# Patient Record
Sex: MALE | Race: WHITE | Employment: FULL TIME | URBAN - METROPOLITAN AREA
[De-identification: names, ages, dates, MRNs, and addresses within clinical notes are randomized per-mention and may not be internally consistent; named-entity substitution may affect disease eponyms.]

---

## 2017-10-30 ENCOUNTER — HOSPITAL ENCOUNTER (OUTPATIENT)
Age: 47
Setting detail: SPECIMEN
Discharge: HOME | End: 2017-10-30
Payer: COMMERCIAL

## 2017-10-30 DIAGNOSIS — I10 ESSENTIAL HYPERTENSION: ICD-10-CM

## 2017-10-30 DIAGNOSIS — E78.5 HYPERLIPIDEMIA, UNSPECIFIED HYPERLIPIDEMIA TYPE: ICD-10-CM

## 2017-10-30 DIAGNOSIS — Z11.4 ENCOUNTER FOR SCREENING FOR HIV: ICD-10-CM

## 2017-10-30 LAB
ANION GAP SERPL CALCULATED.3IONS-SCNC: 15 MMOL/L (ref 9–17)
BUN BLDV-MCNC: 13 MG/DL (ref 6–20)
BUN/CREAT BLD: NORMAL (ref 9–20)
CALCIUM SERPL-MCNC: 9.6 MG/DL (ref 8.6–10.4)
CHLORIDE BLD-SCNC: 103 MMOL/L (ref 98–107)
CHOLESTEROL, FASTING: 165 MG/DL
CHOLESTEROL/HDL RATIO: 3.7
CO2: 23 MMOL/L (ref 20–31)
CREAT SERPL-MCNC: 0.82 MG/DL (ref 0.7–1.2)
GFR AFRICAN AMERICAN: >60 ML/MIN
GFR NON-AFRICAN AMERICAN: >60 ML/MIN
GFR SERPL CREATININE-BSD FRML MDRD: NORMAL ML/MIN/{1.73_M2}
GFR SERPL CREATININE-BSD FRML MDRD: NORMAL ML/MIN/{1.73_M2}
GLUCOSE FASTING: 84 MG/DL (ref 70–99)
HDLC SERPL-MCNC: 45 MG/DL
HIV AG/AB: NONREACTIVE
LDL CHOLESTEROL: 98 MG/DL (ref 0–130)
POTASSIUM SERPL-SCNC: 4.4 MMOL/L (ref 3.7–5.3)
SODIUM BLD-SCNC: 141 MMOL/L (ref 135–144)
TRIGLYCERIDE, FASTING: 112 MG/DL
TSH SERPL DL<=0.05 MIU/L-ACNC: 1.47 MIU/L (ref 0.3–5)
VLDLC SERPL CALC-MCNC: NORMAL MG/DL (ref 1–30)

## 2018-04-23 PROBLEM — E78.5 HYPERLIPIDEMIA: Status: ACTIVE | Noted: 2018-04-23

## 2019-04-22 ENCOUNTER — HOSPITAL ENCOUNTER (OUTPATIENT)
Age: 49
Setting detail: SPECIMEN
Discharge: HOME | End: 2019-04-22
Payer: COMMERCIAL

## 2019-04-22 DIAGNOSIS — I10 ESSENTIAL HYPERTENSION: ICD-10-CM

## 2019-04-22 DIAGNOSIS — E78.5 HYPERLIPIDEMIA, UNSPECIFIED HYPERLIPIDEMIA TYPE: ICD-10-CM

## 2019-04-22 LAB
ALBUMIN SERPL-MCNC: 4.4 G/DL (ref 3.5–5.2)
ALBUMIN/GLOBULIN RATIO: 1.3 (ref 1–2.5)
ALP BLD-CCNC: 70 U/L (ref 40–129)
ALT SERPL-CCNC: 39 U/L (ref 5–41)
ANION GAP SERPL CALCULATED.3IONS-SCNC: 13 MMOL/L (ref 9–17)
AST SERPL-CCNC: 25 U/L
BILIRUB SERPL-MCNC: 0.56 MG/DL (ref 0.3–1.2)
BUN BLDV-MCNC: 11 MG/DL (ref 6–20)
BUN/CREAT BLD: NORMAL (ref 9–20)
CALCIUM SERPL-MCNC: 9.5 MG/DL (ref 8.6–10.4)
CHLORIDE BLD-SCNC: 101 MMOL/L (ref 98–107)
CHOLESTEROL, FASTING: 167 MG/DL
CHOLESTEROL/HDL RATIO: 3.6
CO2: 22 MMOL/L (ref 20–31)
CREAT SERPL-MCNC: 0.8 MG/DL (ref 0.7–1.2)
GFR AFRICAN AMERICAN: >60 ML/MIN
GFR NON-AFRICAN AMERICAN: >60 ML/MIN
GFR SERPL CREATININE-BSD FRML MDRD: NORMAL ML/MIN/{1.73_M2}
GFR SERPL CREATININE-BSD FRML MDRD: NORMAL ML/MIN/{1.73_M2}
GLUCOSE FASTING: 78 MG/DL (ref 70–99)
HDLC SERPL-MCNC: 46 MG/DL
LDL CHOLESTEROL: 97 MG/DL (ref 0–130)
POTASSIUM SERPL-SCNC: 4.2 MMOL/L (ref 3.7–5.3)
SODIUM BLD-SCNC: 136 MMOL/L (ref 135–144)
TOTAL PROTEIN: 7.7 G/DL (ref 6.4–8.3)
TRIGLYCERIDE, FASTING: 122 MG/DL
TSH SERPL DL<=0.05 MIU/L-ACNC: 1.81 MIU/L (ref 0.3–5)
VLDLC SERPL CALC-MCNC: NORMAL MG/DL (ref 1–30)

## 2019-09-09 ENCOUNTER — HOSPITAL ENCOUNTER (OUTPATIENT)
Age: 49
Setting detail: SPECIMEN
Discharge: HOME | End: 2019-09-09
Payer: COMMERCIAL

## 2019-09-09 DIAGNOSIS — K11.20 SIALOADENITIS: ICD-10-CM

## 2019-09-09 LAB
ABSOLUTE EOS #: 0.28 K/UL (ref 0–0.44)
ABSOLUTE IMMATURE GRANULOCYTE: 0.05 K/UL (ref 0–0.3)
ABSOLUTE LYMPH #: 2.43 K/UL (ref 1.1–3.7)
ABSOLUTE MONO #: 0.84 K/UL (ref 0.1–1.2)
BASOPHILS # BLD: 1 % (ref 0–2)
BASOPHILS ABSOLUTE: 0.07 K/UL (ref 0–0.2)
DIFFERENTIAL TYPE: ABNORMAL
EOSINOPHILS RELATIVE PERCENT: 3 % (ref 1–4)
HCT VFR BLD CALC: 51.5 % (ref 40.7–50.3)
HEMOGLOBIN: 16.6 G/DL (ref 13–17)
IMMATURE GRANULOCYTES: 1 %
LYMPHOCYTES # BLD: 23 % (ref 24–43)
MCH RBC QN AUTO: 27.4 PG (ref 25.2–33.5)
MCHC RBC AUTO-ENTMCNC: 32.2 G/DL (ref 28.4–34.8)
MCV RBC AUTO: 85 FL (ref 82.6–102.9)
MONOCYTES # BLD: 8 % (ref 3–12)
NRBC AUTOMATED: 0 PER 100 WBC
PDW BLD-RTO: 13.5 % (ref 11.8–14.4)
PLATELET # BLD: 437 K/UL (ref 138–453)
PLATELET ESTIMATE: ABNORMAL
PMV BLD AUTO: 8.8 FL (ref 8.1–13.5)
RBC # BLD: 6.06 M/UL (ref 4.21–5.77)
RBC # BLD: ABNORMAL 10*6/UL
SEG NEUTROPHILS: 64 % (ref 36–65)
SEGMENTED NEUTROPHILS ABSOLUTE COUNT: 6.8 K/UL (ref 1.5–8.1)
WBC # BLD: 10.5 K/UL (ref 3.5–11.3)
WBC # BLD: ABNORMAL 10*3/UL

## 2020-11-12 ENCOUNTER — HOSPITAL ENCOUNTER (OUTPATIENT)
Age: 50
Setting detail: SPECIMEN
Discharge: HOME | End: 2020-11-12
Payer: COMMERCIAL

## 2020-11-12 LAB
ABSOLUTE EOS #: 0.32 K/UL (ref 0–0.44)
ABSOLUTE IMMATURE GRANULOCYTE: 0.04 K/UL (ref 0–0.3)
ABSOLUTE LYMPH #: 2.19 K/UL (ref 1.1–3.7)
ABSOLUTE MONO #: 0.91 K/UL (ref 0.1–1.2)
ALBUMIN SERPL-MCNC: 4.4 G/DL (ref 3.5–5.2)
ALBUMIN/GLOBULIN RATIO: 1.3 (ref 1–2.5)
ALP BLD-CCNC: 79 U/L (ref 40–129)
ALT SERPL-CCNC: 47 U/L (ref 5–41)
ANION GAP SERPL CALCULATED.3IONS-SCNC: 12 MMOL/L (ref 9–17)
AST SERPL-CCNC: 30 U/L
BASOPHILS # BLD: 1 % (ref 0–2)
BASOPHILS ABSOLUTE: 0.06 K/UL (ref 0–0.2)
BILIRUB SERPL-MCNC: 0.58 MG/DL (ref 0.3–1.2)
BUN BLDV-MCNC: 13 MG/DL (ref 6–20)
BUN/CREAT BLD: ABNORMAL (ref 9–20)
CALCIUM SERPL-MCNC: 9.3 MG/DL (ref 8.6–10.4)
CHLORIDE BLD-SCNC: 102 MMOL/L (ref 98–107)
CHOLESTEROL, FASTING: 152 MG/DL
CHOLESTEROL/HDL RATIO: 3.5
CO2: 24 MMOL/L (ref 20–31)
CREAT SERPL-MCNC: 0.95 MG/DL (ref 0.7–1.2)
DIFFERENTIAL TYPE: ABNORMAL
EOSINOPHILS RELATIVE PERCENT: 3 % (ref 1–4)
GFR AFRICAN AMERICAN: >60 ML/MIN
GFR NON-AFRICAN AMERICAN: >60 ML/MIN
GFR SERPL CREATININE-BSD FRML MDRD: ABNORMAL ML/MIN/{1.73_M2}
GFR SERPL CREATININE-BSD FRML MDRD: ABNORMAL ML/MIN/{1.73_M2}
GLUCOSE FASTING: 84 MG/DL (ref 70–99)
HCT VFR BLD CALC: 51 % (ref 40.7–50.3)
HDLC SERPL-MCNC: 43 MG/DL
HEMOGLOBIN: 16.9 G/DL (ref 13–17)
IMMATURE GRANULOCYTES: 0 %
LDL CHOLESTEROL: 88 MG/DL (ref 0–130)
LYMPHOCYTES # BLD: 21 % (ref 24–43)
MCH RBC QN AUTO: 28.2 PG (ref 25.2–33.5)
MCHC RBC AUTO-ENTMCNC: 33.1 G/DL (ref 28.4–34.8)
MCV RBC AUTO: 85.1 FL (ref 82.6–102.9)
MONOCYTES # BLD: 9 % (ref 3–12)
NRBC AUTOMATED: 0 PER 100 WBC
PDW BLD-RTO: 13 % (ref 11.8–14.4)
PLATELET # BLD: 466 K/UL (ref 138–453)
PLATELET ESTIMATE: ABNORMAL
PMV BLD AUTO: 9.2 FL (ref 8.1–13.5)
POTASSIUM SERPL-SCNC: 4.3 MMOL/L (ref 3.7–5.3)
PROSTATE SPECIFIC ANTIGEN: 0.62 UG/L
RBC # BLD: 5.99 M/UL (ref 4.21–5.77)
RBC # BLD: ABNORMAL 10*6/UL
SEG NEUTROPHILS: 66 % (ref 36–65)
SEGMENTED NEUTROPHILS ABSOLUTE COUNT: 6.84 K/UL (ref 1.5–8.1)
SEX HORMONE BINDING GLOBULIN: 56 NMOL/L (ref 11–80)
SODIUM BLD-SCNC: 138 MMOL/L (ref 135–144)
TESTOSTERONE FREE-NONMALE: 75 PG/ML (ref 47–244)
TESTOSTERONE TOTAL: 509 NG/DL (ref 220–1000)
THYROXINE, FREE: 0.95 NG/DL (ref 0.93–1.7)
TOTAL PROTEIN: 7.7 G/DL (ref 6.4–8.3)
TRIGLYCERIDE, FASTING: 106 MG/DL
TSH SERPL DL<=0.05 MIU/L-ACNC: 1.61 MIU/L (ref 0.3–5)
VLDLC SERPL CALC-MCNC: NORMAL MG/DL (ref 1–30)
WBC # BLD: 10.4 K/UL (ref 3.5–11.3)
WBC # BLD: ABNORMAL 10*3/UL

## 2022-03-01 ENCOUNTER — HOSPITAL ENCOUNTER (OUTPATIENT)
Dept: GENERAL RADIOLOGY | Facility: CLINIC | Age: 52
Discharge: HOME | End: 2022-03-03
Payer: COMMERCIAL

## 2022-03-01 ENCOUNTER — HOSPITAL ENCOUNTER (OUTPATIENT)
Age: 52
Setting detail: SPECIMEN
Discharge: HOME | End: 2022-03-01

## 2022-03-01 DIAGNOSIS — M25.561 CHRONIC PAIN OF RIGHT KNEE: ICD-10-CM

## 2022-03-01 DIAGNOSIS — I10 ESSENTIAL HYPERTENSION: ICD-10-CM

## 2022-03-01 DIAGNOSIS — M25.562 CHRONIC PAIN OF BOTH KNEES: ICD-10-CM

## 2022-03-01 DIAGNOSIS — M25.561 CHRONIC PAIN OF BOTH KNEES: ICD-10-CM

## 2022-03-01 DIAGNOSIS — G89.29 CHRONIC PAIN OF RIGHT KNEE: ICD-10-CM

## 2022-03-01 DIAGNOSIS — G89.29 CHRONIC PAIN OF BOTH KNEES: ICD-10-CM

## 2022-03-01 DIAGNOSIS — E78.5 HYPERLIPIDEMIA, UNSPECIFIED HYPERLIPIDEMIA TYPE: ICD-10-CM

## 2022-03-01 LAB
ALBUMIN SERPL-MCNC: 4.5 G/DL (ref 3.5–5.2)
ALBUMIN/GLOBULIN RATIO: 1.6 (ref 1–2.5)
ALP BLD-CCNC: 76 U/L (ref 40–129)
ALT SERPL-CCNC: 34 U/L (ref 5–41)
ANION GAP SERPL CALCULATED.3IONS-SCNC: 14 MMOL/L (ref 9–17)
AST SERPL-CCNC: 23 U/L
BILIRUB SERPL-MCNC: 0.73 MG/DL (ref 0.3–1.2)
BUN BLDV-MCNC: 16 MG/DL (ref 6–20)
CALCIUM SERPL-MCNC: 9.5 MG/DL (ref 8.6–10.4)
CHLORIDE BLD-SCNC: 103 MMOL/L (ref 98–107)
CHOLESTEROL, FASTING: 215 MG/DL
CHOLESTEROL/HDL RATIO: 4.8
CO2: 24 MMOL/L (ref 20–31)
CREAT SERPL-MCNC: 0.89 MG/DL (ref 0.7–1.2)
GFR AFRICAN AMERICAN: >60 ML/MIN
GFR NON-AFRICAN AMERICAN: >60 ML/MIN
GFR SERPL CREATININE-BSD FRML MDRD: NORMAL ML/MIN/{1.73_M2}
GLUCOSE FASTING: 70 MG/DL (ref 70–99)
HDLC SERPL-MCNC: 45 MG/DL
LDL CHOLESTEROL: 148 MG/DL (ref 0–130)
POTASSIUM SERPL-SCNC: 4.6 MMOL/L (ref 3.7–5.3)
SODIUM BLD-SCNC: 141 MMOL/L (ref 135–144)
TOTAL PROTEIN: 7.3 G/DL (ref 6.4–8.3)
TRIGLYCERIDE, FASTING: 112 MG/DL
TSH SERPL DL<=0.05 MIU/L-ACNC: 1.79 MIU/L (ref 0.3–5)

## 2022-03-01 PROCEDURE — 73562 X-RAY EXAM OF KNEE 3: CPT

## 2022-03-21 ENCOUNTER — HOSPITAL ENCOUNTER (OUTPATIENT)
Dept: LAB | Age: 52
Setting detail: SPECIMEN
Discharge: HOME | End: 2022-03-21
Payer: COMMERCIAL

## 2022-03-21 DIAGNOSIS — Z01.818 PREOP TESTING: Primary | ICD-10-CM

## 2022-03-21 PROCEDURE — U0003 INFECTIOUS AGENT DETECTION BY NUCLEIC ACID (DNA OR RNA); SEVERE ACUTE RESPIRATORY SYNDROME CORONAVIRUS 2 (SARS-COV-2) (CORONAVIRUS DISEASE [COVID-19]), AMPLIFIED PROBE TECHNIQUE, MAKING USE OF HIGH THROUGHPUT TECHNOLOGIES AS DESCRIBED BY CMS-2020-01-R: HCPCS

## 2022-03-21 PROCEDURE — U0005 INFEC AGEN DETEC AMPLI PROBE: HCPCS

## 2022-03-22 LAB
SARS-COV-2: NORMAL
SARS-COV-2: NOT DETECTED
SOURCE: NORMAL

## 2022-03-24 ENCOUNTER — ANESTHESIA EVENT (OUTPATIENT)
Dept: OPERATING ROOM | Age: 52
End: 2022-03-24
Payer: COMMERCIAL

## 2022-03-25 ENCOUNTER — ANESTHESIA (OUTPATIENT)
Dept: OPERATING ROOM | Age: 52
End: 2022-03-25
Payer: COMMERCIAL

## 2022-03-25 ENCOUNTER — HOSPITAL ENCOUNTER (OUTPATIENT)
Age: 52
Setting detail: OUTPATIENT SURGERY
Discharge: HOME | End: 2022-03-25
Attending: OTOLARYNGOLOGY | Admitting: OTOLARYNGOLOGY
Payer: COMMERCIAL

## 2022-03-25 VITALS
OXYGEN SATURATION: 97 % | HEIGHT: 68 IN | BODY MASS INDEX: 41.36 KG/M2 | WEIGHT: 272.93 LBS | DIASTOLIC BLOOD PRESSURE: 86 MMHG | RESPIRATION RATE: 12 BRPM | TEMPERATURE: 97.6 F | SYSTOLIC BLOOD PRESSURE: 143 MMHG | HEART RATE: 83 BPM

## 2022-03-25 VITALS — DIASTOLIC BLOOD PRESSURE: 130 MMHG | SYSTOLIC BLOOD PRESSURE: 200 MMHG | OXYGEN SATURATION: 94 % | TEMPERATURE: 96.8 F

## 2022-03-25 PROCEDURE — 7100000011 HC PHASE II RECOVERY - ADDTL 15 MIN: Performed by: OTOLARYNGOLOGY

## 2022-03-25 PROCEDURE — 3600000012 HC SURGERY LEVEL 2 ADDTL 15MIN: Performed by: OTOLARYNGOLOGY

## 2022-03-25 PROCEDURE — 7100000001 HC PACU RECOVERY - ADDTL 15 MIN: Performed by: OTOLARYNGOLOGY

## 2022-03-25 PROCEDURE — 7100000000 HC PACU RECOVERY - FIRST 15 MIN: Performed by: OTOLARYNGOLOGY

## 2022-03-25 PROCEDURE — 88305 TISSUE EXAM BY PATHOLOGIST: CPT

## 2022-03-25 PROCEDURE — 6370000000 HC RX 637 (ALT 250 FOR IP): Performed by: ANESTHESIOLOGY

## 2022-03-25 PROCEDURE — 2500000003 HC RX 250 WO HCPCS: Performed by: SPECIALIST

## 2022-03-25 PROCEDURE — 3700000001 HC ADD 15 MINUTES (ANESTHESIA): Performed by: OTOLARYNGOLOGY

## 2022-03-25 PROCEDURE — 7100000010 HC PHASE II RECOVERY - FIRST 15 MIN: Performed by: OTOLARYNGOLOGY

## 2022-03-25 PROCEDURE — 2580000003 HC RX 258: Performed by: ANESTHESIOLOGY

## 2022-03-25 PROCEDURE — 88313 SPECIAL STAINS GROUP 2: CPT

## 2022-03-25 PROCEDURE — 3700000000 HC ANESTHESIA ATTENDED CARE: Performed by: OTOLARYNGOLOGY

## 2022-03-25 PROCEDURE — 2709999900 HC NON-CHARGEABLE SUPPLY: Performed by: OTOLARYNGOLOGY

## 2022-03-25 PROCEDURE — 6360000002 HC RX W HCPCS: Performed by: SPECIALIST

## 2022-03-25 PROCEDURE — 3600000002 HC SURGERY LEVEL 2 BASE: Performed by: OTOLARYNGOLOGY

## 2022-03-25 RX ORDER — SODIUM CHLORIDE 9 MG/ML
25 INJECTION, SOLUTION INTRAVENOUS PRN
Status: DISCONTINUED | OUTPATIENT
Start: 2022-03-25 | End: 2022-03-25 | Stop reason: HOSPADM

## 2022-03-25 RX ORDER — SODIUM CHLORIDE, SODIUM LACTATE, POTASSIUM CHLORIDE, CALCIUM CHLORIDE 600; 310; 30; 20 MG/100ML; MG/100ML; MG/100ML; MG/100ML
INJECTION, SOLUTION INTRAVENOUS CONTINUOUS
Status: DISCONTINUED | OUTPATIENT
Start: 2022-03-26 | End: 2022-03-25 | Stop reason: HOSPADM

## 2022-03-25 RX ORDER — FENTANYL CITRATE 50 UG/ML
INJECTION, SOLUTION INTRAMUSCULAR; INTRAVENOUS PRN
Status: DISCONTINUED | OUTPATIENT
Start: 2022-03-25 | End: 2022-03-25 | Stop reason: SDUPTHER

## 2022-03-25 RX ORDER — FENTANYL CITRATE 50 UG/ML
25 INJECTION, SOLUTION INTRAMUSCULAR; INTRAVENOUS EVERY 5 MIN PRN
Status: DISCONTINUED | OUTPATIENT
Start: 2022-03-25 | End: 2022-03-25 | Stop reason: HOSPADM

## 2022-03-25 RX ORDER — DEXAMETHASONE SODIUM PHOSPHATE 10 MG/ML
INJECTION INTRAMUSCULAR; INTRAVENOUS PRN
Status: DISCONTINUED | OUTPATIENT
Start: 2022-03-25 | End: 2022-03-25 | Stop reason: SDUPTHER

## 2022-03-25 RX ORDER — PROPOFOL 10 MG/ML
INJECTION, EMULSION INTRAVENOUS PRN
Status: DISCONTINUED | OUTPATIENT
Start: 2022-03-25 | End: 2022-03-25 | Stop reason: SDUPTHER

## 2022-03-25 RX ORDER — LIDOCAINE HYDROCHLORIDE 20 MG/ML
INJECTION, SOLUTION EPIDURAL; INFILTRATION; INTRACAUDAL; PERINEURAL PRN
Status: DISCONTINUED | OUTPATIENT
Start: 2022-03-25 | End: 2022-03-25 | Stop reason: SDUPTHER

## 2022-03-25 RX ORDER — ROCURONIUM BROMIDE 10 MG/ML
INJECTION, SOLUTION INTRAVENOUS PRN
Status: DISCONTINUED | OUTPATIENT
Start: 2022-03-25 | End: 2022-03-25 | Stop reason: SDUPTHER

## 2022-03-25 RX ORDER — HYDROMORPHONE HYDROCHLORIDE 1 MG/ML
0.25 INJECTION, SOLUTION INTRAMUSCULAR; INTRAVENOUS; SUBCUTANEOUS EVERY 5 MIN PRN
Status: DISCONTINUED | OUTPATIENT
Start: 2022-03-25 | End: 2022-03-25 | Stop reason: HOSPADM

## 2022-03-25 RX ORDER — SODIUM CHLORIDE 0.9 % (FLUSH) 0.9 %
5-40 SYRINGE (ML) INJECTION EVERY 12 HOURS SCHEDULED
Status: DISCONTINUED | OUTPATIENT
Start: 2022-03-25 | End: 2022-03-25 | Stop reason: HOSPADM

## 2022-03-25 RX ORDER — SODIUM CHLORIDE 0.9 % (FLUSH) 0.9 %
5-40 SYRINGE (ML) INJECTION PRN
Status: DISCONTINUED | OUTPATIENT
Start: 2022-03-25 | End: 2022-03-25 | Stop reason: HOSPADM

## 2022-03-25 RX ORDER — ONDANSETRON 2 MG/ML
INJECTION INTRAMUSCULAR; INTRAVENOUS PRN
Status: DISCONTINUED | OUTPATIENT
Start: 2022-03-25 | End: 2022-03-25 | Stop reason: SDUPTHER

## 2022-03-25 RX ORDER — ONDANSETRON 2 MG/ML
4 INJECTION INTRAMUSCULAR; INTRAVENOUS
Status: DISCONTINUED | OUTPATIENT
Start: 2022-03-25 | End: 2022-03-25 | Stop reason: HOSPADM

## 2022-03-25 RX ORDER — AMLODIPINE BESYLATE 10 MG/1
10 TABLET ORAL ONCE
Status: COMPLETED | OUTPATIENT
Start: 2022-03-25 | End: 2022-03-25

## 2022-03-25 RX ORDER — AMLODIPINE BESYLATE 10 MG/1
10 TABLET ORAL DAILY
Status: DISCONTINUED | OUTPATIENT
Start: 2022-03-25 | End: 2022-03-25

## 2022-03-25 RX ORDER — LIDOCAINE HYDROCHLORIDE 10 MG/ML
1 INJECTION, SOLUTION EPIDURAL; INFILTRATION; INTRACAUDAL; PERINEURAL
Status: DISCONTINUED | OUTPATIENT
Start: 2022-03-25 | End: 2022-03-25 | Stop reason: HOSPADM

## 2022-03-25 RX ADMIN — ONDANSETRON 4 MG: 2 INJECTION INTRAMUSCULAR; INTRAVENOUS at 09:54

## 2022-03-25 RX ADMIN — SODIUM CHLORIDE, POTASSIUM CHLORIDE, SODIUM LACTATE AND CALCIUM CHLORIDE: 600; 310; 30; 20 INJECTION, SOLUTION INTRAVENOUS at 07:56

## 2022-03-25 RX ADMIN — LIDOCAINE HYDROCHLORIDE 100 MG: 20 INJECTION, SOLUTION EPIDURAL; INFILTRATION; INTRACAUDAL; PERINEURAL at 09:42

## 2022-03-25 RX ADMIN — ROCURONIUM BROMIDE 40 MG: 10 INJECTION, SOLUTION INTRAVENOUS at 09:42

## 2022-03-25 RX ADMIN — SUGAMMADEX 200 MG: 100 INJECTION, SOLUTION INTRAVENOUS at 09:57

## 2022-03-25 RX ADMIN — Medication 75 MCG: at 09:42

## 2022-03-25 RX ADMIN — DEXAMETHASONE SODIUM PHOSPHATE 10 MG: 10 INJECTION INTRAMUSCULAR; INTRAVENOUS at 09:52

## 2022-03-25 RX ADMIN — AMLODIPINE BESYLATE 10 MG: 10 TABLET ORAL at 08:26

## 2022-03-25 RX ADMIN — PROPOFOL 200 MG: 10 INJECTION, EMULSION INTRAVENOUS at 09:42

## 2022-03-25 ASSESSMENT — PULMONARY FUNCTION TESTS
PIF_VALUE: 23
PIF_VALUE: 35
PIF_VALUE: 29
PIF_VALUE: 1
PIF_VALUE: 1
PIF_VALUE: 35
PIF_VALUE: 1
PIF_VALUE: 34
PIF_VALUE: 35
PIF_VALUE: 2
PIF_VALUE: 1
PIF_VALUE: 35
PIF_VALUE: 11
PIF_VALUE: 11
PIF_VALUE: 37
PIF_VALUE: 35
PIF_VALUE: 23
PIF_VALUE: 1
PIF_VALUE: 1
PIF_VALUE: 5
PIF_VALUE: 0
PIF_VALUE: 35
PIF_VALUE: 1
PIF_VALUE: 22
PIF_VALUE: 35

## 2022-03-25 ASSESSMENT — PAIN - FUNCTIONAL ASSESSMENT: PAIN_FUNCTIONAL_ASSESSMENT: 0-10

## 2022-03-25 ASSESSMENT — PAIN SCALES - GENERAL
PAINLEVEL_OUTOF10: 0
PAINLEVEL_OUTOF10: 0

## 2022-03-25 NOTE — ANESTHESIA PRE PROCEDURE
Department of Anesthesiology  Preprocedure Note       Name:  Reji Diana   Age:  45 y.o.  :  1970                                          MRN:  [id number]         Date:  3/25/2022      Surgeon: Dorothy Parish): Elizabeth Tabor MD    Procedure: Procedure(s):  DIRECT LARYNGOSCOPY MICRO WITH EXCISION VOCAL CORD TUMOR    Medications prior to admission:   Prior to Admission medications    Medication Sig Start Date End Date Taking? Authorizing Provider   amLODIPine-atorvastatatin (CADUET) 10-20 MG per tablet Take 1 tablet by mouth daily 21   [facility], DO   fexofenadine (RA ALLERGY RELIEF) 180 MG tablet take 1 tablet by mouth once daily for allergies if needed 21   [facility], DO   ammonium lactate (LAC-HYDRIN) 12 % lotion Apply topically daily. 20   [facility], DO       Current medications:    Current Facility-Administered Medications   Medication Dose Route Frequency Provider Last Rate Last Admin    lidocaine PF 1 % injection 1 mL  1 mL IntraDERmal Once PRN MD Maegan Rutledge ON 3/26/2022] lactated ringers infusion   IntraVENous Continuous David Bryan MD 50 mL/hr at 22 0756 New Bag at 22 0756       Allergies: Allergies   Allergen Reactions    Molds & Smuts        Problem List:    Patient Active Problem List   Diagnosis Code    Dry skin dermatitis L85.3    Recurrent sinusitis J32.9    HTN (hypertension) I10    Hyperlipidemia E78.5       Past Medical History:        Diagnosis Date    Arthritis     Dry skin dermatitis 2015    HTN (hypertension) 2015    Hyperlipidemia 2018    Recurrent sinusitis 2015       Past Surgical History:        Procedure Laterality Date    RHINOPLASTY      TONSILLECTOMY      TURBINATE RESECTION      VASECTOMY         Social History:    Social History     Tobacco Use    Smoking status: Never Smoker    Smokeless tobacco: Never Used   Substance Use Topics    Alcohol use:  Yes Counseling given: Not Answered      Vital Signs (Current):   Vitals:    03/25/22 0721 03/25/22 0729 03/25/22 0746 03/25/22 0826   BP: (!) 204/112  (!) 167/92 (!) 167/92   Pulse: 103  95    Resp: 20      Temp: 98.6 °F (37 °C)      TempSrc: Temporal      SpO2: 98%      Weight:  272 lb 14.9 oz (123.8 kg)     Height:  5' 8\" (1.727 m)                                                BP Readings from Last 3 Encounters:   03/25/22 (!) 167/92   11/11/21 128/80   05/11/21 138/88       NPO Status: Time of last liquid consumption: 2200                        Time of last solid consumption: 2200                        Date of last liquid consumption: 03/24/22                        Date of last solid food consumption: 03/24/22    BMI:   Wt Readings from Last 3 Encounters:   03/25/22 272 lb 14.9 oz (123.8 kg)   11/11/21 280 lb (127 kg)   05/11/21 289 lb 6.4 oz (131.3 kg)     Body mass index is 41.5 kg/m². CBC:   Lab Results   Component Value Date    WBC 10.4 11/12/2020    RBC 5.99 11/12/2020    HGB 16.9 11/12/2020    HCT 51.0 11/12/2020    MCV 85.1 11/12/2020    RDW 13.0 11/12/2020     11/12/2020       CMP:   Lab Results   Component Value Date     03/01/2022    K 4.6 03/01/2022     03/01/2022    CO2 24 03/01/2022    BUN 16 03/01/2022    CREATININE 0.89 03/01/2022    GFRAA >60 03/01/2022    LABGLOM >60 03/01/2022    GLUCOSE 82 02/08/2016    PROT 7.3 03/01/2022    CALCIUM 9.5 03/01/2022    BILITOT 0.73 03/01/2022    ALKPHOS 76 03/01/2022    AST 23 03/01/2022    ALT 34 03/01/2022       POC Tests: No results for input(s): POCGLU, POCNA, POCK, POCCL, POCBUN, POCHEMO, POCHCT in the last 72 hours.     Coags: No results found for: PROTIME, INR, APTT    HCG (If Applicable): No results found for: PREGTESTUR, PREGSERUM, HCG, HCGQUANT     ABGs: No results found for: PHART, PO2ART, FAQ8UTS, GLF6QNQ, BEART, M2VSYIGJ     Type & Screen (If Applicable):  No results found for: LABABO, LABRH    Drug/Infectious Status (If Applicable):  No results found for: HIV, HEPCAB    COVID-19 Screening (If Applicable):   Lab Results   Component Value Date    COVID19 Not Detected 03/21/2022           Anesthesia Evaluation   no history of anesthetic complications:   Airway: Mallampati: III  TM distance: >3 FB   Neck ROM: full  Mouth opening: > = 3 FB Dental:          Pulmonary:Negative Pulmonary ROS and normal exam                               Cardiovascular:  Exercise tolerance: good (>4 METS),   (+) hypertension:,     (-)  angina                Neuro/Psych:   Negative Neuro/Psych ROS              GI/Hepatic/Renal: Neg GI/Hepatic/Renal ROS  (+) morbid obesity          Endo/Other: Negative Endo/Other ROS   (+) : arthritis: OA., .                 Abdominal:             Vascular: Other Findings:           Anesthesia Plan      general     ASA 2     (Glide )  Induction: intravenous. MIPS: Postoperative opioids intended and Prophylactic antiemetics administered. Anesthetic plan and risks discussed with patient. Plan discussed with CRNA.     Attending anesthesiologist reviewed and agrees with Kenna Lubin MD   3/25/2022

## 2022-03-25 NOTE — H&P
Interval H&P Note    Pt Name: Price Yung  MRN: [id number]  YOB: 1970  Date of evaluation: 3/25/2022      [x] I have reviewed the hardcopy ENT Note by Dr Dion Pantoja dated 3/3/22 labeled in short chart for an Interval History and Physical note. [x] I have examined  Price Yung  There are no changes to the patient who is scheduled for a direct laryngoscopy micro with excision vocal cord tumor by Dr Dion Pantoja for dysphonia, radicular cyst, GERD The patient denies new health changes, fever, chills, wheezing, cough, increased SOB, chest pain, open sores or wounds. Past Medical History:     Past Medical History:   Diagnosis Date    Arthritis     Dry skin dermatitis 2/17/2015    HTN (hypertension) 5/18/2015    Hyperlipidemia 4/23/2018    Recurrent sinusitis 2/17/2015        Past Surgical History:     Past Surgical History:   Procedure Laterality Date    RHINOPLASTY      TONSILLECTOMY      TURBINATE RESECTION      VASECTOMY  1994        Social History:     Tobacco:    reports that he has never smoked. He has never used smokeless tobacco.  Alcohol:      reports current alcohol use. Drug Use:  reports no history of drug use. Family History:     Family History   Problem Relation Age of Onset    Heart Disease Maternal Grandmother     Cancer Maternal Grandfather         Colon/Rectal  cancer       Vital signs: BP (!) 167/92   Pulse 95   Temp 98.6 °F (37 °C) (Temporal)   Resp 20   Ht 5' 8\" (1.727 m)   Wt 272 lb 14.9 oz (123.8 kg)   SpO2 98%   BMI 41.50 kg/m²     Allergies:  Molds & smuts    Medications:    Prior to Admission medications    Medication Sig Start Date End Date Taking?  Authorizing Provider   amLODIPine-atorvastatatin (CADUET) 10-20 MG per tablet Take 1 tablet by mouth daily 11/11/21   Mar Bitter, DO   fexofenadine (RA ALLERGY RELIEF) 180 MG tablet take 1 tablet by mouth once daily for allergies if needed 11/11/21   Mar Bitter, DO   ammonium lactate (LAC-HYDRIN) 12 % lotion Apply topically daily. 11/12/20   Inell Bosworth, DO         This is a 45 y.o.morbidly obese male who is pleasant, cooperative, alert and oriented x3, in no acute distress    Physical Exam:  Lungs: Bilateral equal air entry, unlabored, clear to ausculation, no wheezing, rales or rhonchi, normal effort  Cardiovascular: HR 95 normal rate, regular rhythm, no murmur, gallop, rub. No carotid bruits  Abdomen: Obese, round, nontender, nondistended, normal bowel sounds.     Labs:  Recent Labs     03/01/22  1330      K 4.6      CO2 24   BUN 16   CREATININE 0.89   AST 23   ALT 34   LABALBU 4.5       Recent Labs     03/21/22  4908 Greg Hauser       Not Detected       Myak, Ofilia Dangelo, APRN - CNP   Electronically signed 3/25/2022 at 8:58 AM

## 2022-03-25 NOTE — PROGRESS NOTES
Dr Ken Whiting at bedside. Per Dr. Ken Whiting no food restrictions and minimum 48 hours vocal rest up to 1 week.

## 2022-03-25 NOTE — OP NOTE
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       Procedure(s):  DIRECT MICROLARYNGOSCOPY WITH EXCISION of LEFT VOCAL CORD TUMOR    Surgeon(s): Mitcheal Epley, MD    Assistant:   * No surgical staff found *    Anesthesia: General    Estimated Blood Loss (mL): Minimal    Complications: None    Specimens:   ID Type Source Tests Collected by Time Destination   A : left vocal cord tumor Tissue Mouth SURGICAL PATHOLOGY Afser Lety Lauren MD 3/25/2022 9101        Implants:  * No implants in log *      Drains: * No LDAs found *    Findings: 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 The risks and benefits were discussed. These include but are not limited to bleeding, injury to teeth/gums, mucosal tears, loss of airway, and death. All questions were answered and informed consent was obtained. Procedure:    Diony Conroy  was identified in the preoperative holding area and brought to the operating room. A general anesthetic was given and the patient was intubated. A tooth guard was placed and a rigid laryngoscope was passed into the oral cavity and into the pharynx. Examination of the hard palate, soft palate, tongue, floor of mouth, oropharynx, hypopharynx, pyriform sinuses, esophageal inlet, base of tongue, supraglottis, and true vocal cords was done. Significant findings and additional comments are listed above. The scope was placed on suspension when the lesion on the vocal cord was in clear view. The Microscope was brought into the field. Cup forceps were used to excise the entire tumor with minimal trauma to adjacent mucosa. The mouth guard and scope was removed. There were no complications. Thiago Beauchamp was awakened, extubated, and returned to recovery in acceptable condition.                  Electronically signed by Bob Pryor MD on 3/25/2022 at 9:57 AM

## 2022-03-25 NOTE — ANESTHESIA POSTPROCEDURE EVALUATION
Department of Anesthesiology  Postprocedure Note    Patient: Valeria Joshi  MRN: [id number]  [place]: 1970  Date of evaluation: 3/25/2022  Time:  2:25 PM     Procedure Summary     Date: 22 Room / Location: [address] [place] [address]    Anesthesia Start:  Anesthesia Stop:     Procedure: DIRECT LARYNGOSCOPY MICRO WITH EXCISION VOCAL CORD TUMOR (N/A ) Diagnosis: (DX DYSPHONIA, RADICULAR CYST, GERD)    Surgeons: Katherine Reynaga MD Responsible Provider: Heike Ribera MD    Anesthesia Type: general ASA Status: 2          Anesthesia Type: general    Mitra Phase I: Mitra Score: 9    Mitra Phase II: Mitra Score: 10    Last vitals: Reviewed and per EMR flowsheets.        Anesthesia Post Evaluation    Patient location during evaluation: PACU  Patient participation: complete - patient participated  Level of consciousness: awake  Airway patency: patent  Nausea & Vomiting: no nausea  Complications: no  Cardiovascular status: blood pressure returned to baseline  Respiratory status: acceptable  Hydration status: euvolemic  Comments: Multimodal analgesia pain management as indicated by procedure  Multimodal analgesia pain management approach

## 2022-03-28 LAB — SURGICAL PATHOLOGY REPORT: NORMAL

## 2022-05-20 DIAGNOSIS — M17.0 PRIMARY OSTEOARTHRITIS OF BOTH KNEES: Primary | ICD-10-CM

## 2022-05-23 ENCOUNTER — OFFICE VISIT (OUTPATIENT)
Dept: ORTHOPEDIC SURGERY | Age: 52
End: 2022-05-23
Payer: COMMERCIAL

## 2022-05-23 VITALS — HEIGHT: 68 IN | WEIGHT: 272 LBS | BODY MASS INDEX: 41.22 KG/M2

## 2022-05-23 DIAGNOSIS — M17.0 ARTHRITIS OF BOTH KNEES: Primary | ICD-10-CM

## 2022-05-23 PROCEDURE — 20610 DRAIN/INJ JOINT/BURSA W/O US: CPT | Performed by: PHYSICIAN ASSISTANT

## 2022-05-23 PROCEDURE — 99204 OFFICE O/P NEW MOD 45 MIN: CPT | Performed by: PHYSICIAN ASSISTANT

## 2022-05-23 NOTE — PROGRESS NOTES
[address] [facility]  [facility] [place] [address]  Dept: [phone]    Ambulatory Orthopedic Consult      CHIEF COMPLAINT:    Chief Complaint   Patient presents with    New Patient     BILATERAL KNEE PAIN        HISTORY OF PRESENT ILLNESS:        The patient is a 45 y.o. male who is being seen at the request of  Adarsh Dorsey DO for consultation and evaluation of bilateral knee pain. Saumya Avina  presents for bilateral knee pain that has been present for  2 years but is gradually increased over the last 1 to 2 months. The patient does recall a specific injury. He notes that he was doing yard work and jumped out of the bed of a truck causing discomfort within the bilateral knee. He did not seek medical evaluation and has been doing conservative treatment including icing, elevating and over-the-counter anti-inflammatories to try to help his discomfort. Patient notes the pain worsens with  prolonged walking, prolonged standing, stair climbing and arising from a seated position. Instability is not noted. The patient has not had a previous corticosteroid injection. The patient has not had previous physical therapy for this problem. The patient has tried oral NSAIDs for this problem previously. He denies prior surgery to the bilateral knee.         Past Medical History:    Past Medical History:   Diagnosis Date    Arthritis     Dry skin dermatitis 2/17/2015    HTN (hypertension) 5/18/2015    Hyperlipidemia 4/23/2018    Recurrent sinusitis 2/17/2015       Past Surgical History:    Past Surgical History:   Procedure Laterality Date    LARYNGOSCOPY N/A 3/25/2022    DIRECT LARYNGOSCOPY MICRO WITH EXCISION VOCAL CORD TUMOR performed by Mathew Garcia MD at [address] VASECTOMY  1994       Current Medications:   Current Outpatient Medications   Medication Sig Dispense Refill    amLODIPine-atorvastatatin (CADUET) 10-20 MG per tablet Take 1 tablet by mouth daily 90 tablet 1    fexofenadine (RA ALLERGY RELIEF) 180 MG tablet take 1 tablet by mouth once daily for allergies if needed 90 tablet 1    ammonium lactate (LAC-HYDRIN) 12 % lotion Apply topically daily. 567 g 2     Current Facility-Administered Medications   Medication Dose Route Frequency Provider Last Rate Last Admin    methylPREDNISolone acetate (DEPO-MEDROL) injection 80 mg  80 mg Intra-artICUlar Once Kaity E Pfund, PA-C        methylPREDNISolone acetate (DEPO-MEDROL) injection 80 mg  80 mg Intra-artICUlar Once Kaity E Pfund, PA-C        bupivacaine (MARCAINE) 0.25 % injection 5 mg  2 mL Intra-artICUlar Once Kaity E Pfund, PA-C        bupivacaine (MARCAINE) 0.25 % injection 5 mg  2 mL Intra-artICUlar Once Kaity E Pfund, PA-C           Allergies:    Molds & smuts    Social History:   Social History     Socioeconomic History    Marital status:      Spouse name: Not on file    Number of children: Not on file    Years of education: Not on file    Highest education level: Not on file   Occupational History    Not on file   Tobacco Use    Smoking status: Never Smoker    Smokeless tobacco: Never Used   Vaping Use    Vaping Use: Never used   Substance and Sexual Activity    Alcohol use: Yes    Drug use: Never    Sexual activity: Not on file   Other Topics Concern    Not on file   Social History Narrative    Not on file     Social Determinants of Health     Financial Resource Strain:     Difficulty of Paying Living Expenses: Not on file   Food Insecurity:     Worried About Running Out of Food in the Last Year: Not on file    Jorge of Food in the Last Year: Not on file   Transportation Needs:     Lack of Transportation (Medical): Not on file    Lack of Transportation (Non-Medical):  Not on file   Physical Activity:     Days of Exercise per Week: Not on file    Minutes of Exercise per Session: Not independently without a limp appreciated. Evaluation of the Bilateral knee reveals no significant outward deformity. There is no erythema, skin warmth, skin lesions, or signs of infection appreciated. There is tenderness over the medial joint line with palpation, bilaterally. There is a no appreciable knee effusion noted bilaterally. Range of motion of the Bilateral knee is full. No instability of the knee is appreciated at 0 and 30° of flexion, bilaterally. There is a negative anterior drawer and Lachman's test, bilaterally. There is increased pain with medial Bella's testing, bilaterally. No calf tenderness is noted, bilaterally. There is a negative hip log roll and Stinchfield test on the Bilateral hip. Motor, sensory, vascular examination to the Bilateral lower extremity is intact. Patient has full range of motion of the Bilateral ankle. Radiology:   XR KNEE LEFT (MIN 4 VIEWS)    Result Date: 5/25/2022  History:   Left knee pain. Findings:   Standing AP/Lateral/Tunnel/Merchant view xrays of the Left knee done in the office today shows moderate medial joint space narrowing, tricompartmental osteophytosis, joint line sclerosis medially. No evidence of fracture, subluxation, dislocation, radioopaque foreign body/tumor is noted. Lateral subluxation of the tibia is appreciated. Impression:   Left knee moderate degenerative changes as described above. XR KNEE RIGHT (MIN 4 VIEWS)    Result Date: 5/25/2022  History:   Right knee pain. Findings:   Standing AP/Lateral/Tunnel/Merchant view xrays of the Right knee done in the office today shows moderate medial joint space narrowing, tricompartmental osteophytosis, joint line sclerosis medially. No evidence of fracture, subluxation, dislocation, radioopaque foreign body/tumor is noted. Lateral subluxation of the tibia is appreciated. Impression:   Right knee moderate degenerative changes as described above.       Procedure:  KNEE INJECTION PROCEDURE NOTE:  The patient was identified. Verbal consent was obtained to proceed with a Right  knee injection. The Right knee was confirmed with the patient. After a sterile prep with Betadine the knee was injected using a lateral joint line approach with a mixture of  2mL of 0.25% Marcaine and 80 mg of Depo-Medrol. Patient tolerated the procedure well without post injection complications. I instructed the patient to call our office immediately if they have any swelling or increased pain at the injection site. KNEE INJECTION PROCEDURE NOTE:  The patient was identified. Verbal consent was obtained to proceed with a Left  knee injection. The Left knee was confirmed with the patient. After a sterile prep with Betadine the knee was injected using a lateral joint line approach with a mixture of  2mL of 0.25% Marcaine and 80 mg of Depo-Medrol. Patient tolerated the procedure well without post injection complications. I instructed the patient to call our office immediately if they have any swelling or increased pain at the injection site. ASSESSMENT:     1. Arthritis of both knees         PLAN:       Today in office we discussed etiology and natural history of bilateral knee pain due to osteoarthritis. I personally reviewed the patient's x-rays from today revealing moderate degenerative changes within the bilateral knee. The treatment options may include activity modification, oral anti-inflammatories, bracing, injections, advanced imaging, physical therapy and/or surgical intervention. The patient would like to proceed with:  1.  Bilateral knee corticosteroid injections. The patient was given the injections today in office. He tolerated the procedures without complication.   We discussed that he can have repeat injections every 4 months if necessary therefore he will be eligible for repeat injection on or after 9/23/2022.  2.  Patient was given a packet of home exercises to complete to work on bilateral lower extremity strengthening and range of motion restoration. The patient will follow up in 6 weeks, or sooner if needed. We discussed that the patient should call us with any questions or concerns. The patient voiced his understanding. Return in about 6 weeks (around 7/4/2022) for re-evaluation. Total Time: 45 min      Orders Placed This Encounter   Medications    methylPREDNISolone acetate (DEPO-MEDROL) injection 80 mg    methylPREDNISolone acetate (DEPO-MEDROL) injection 80 mg    bupivacaine (MARCAINE) 0.25 % injection 5 mg    bupivacaine (MARCAINE) 0.25 % injection 5 mg       Orders Placed This Encounter   Procedures    MD ARTHROCENTESIS ASPIR&/INJ MAJOR JT/BURSA W/O US       This note is created with the assistance of a speech recognition program.  While intending to generate a document that actually reflects the content of the visit, the document can still have some errors including those of syntax and sound a like substitutions which may escape proof reading. In such instances, actual meaning can be extrapolated by contextual diversion.      Electronically signed by Flaca Moreno PA-C on 5/25/2022 at 12:54 PM

## 2022-05-25 RX ORDER — BUPIVACAINE HYDROCHLORIDE 2.5 MG/ML
2 INJECTION, SOLUTION INFILTRATION; PERINEURAL ONCE
Status: SHIPPED | OUTPATIENT
Start: 2022-05-25

## 2022-05-25 RX ORDER — METHYLPREDNISOLONE ACETATE 80 MG/ML
80 INJECTION, SUSPENSION INTRA-ARTICULAR; INTRALESIONAL; INTRAMUSCULAR; SOFT TISSUE ONCE
Status: SHIPPED | OUTPATIENT
Start: 2022-05-25

## 2022-05-25 ASSESSMENT — ENCOUNTER SYMPTOMS
SHORTNESS OF BREATH: 0
VOMITING: 0
COLOR CHANGE: 0
COUGH: 0

## 2022-11-07 ENCOUNTER — OFFICE VISIT (OUTPATIENT)
Dept: ORTHOPEDIC SURGERY | Age: 52
End: 2022-11-07
Payer: COMMERCIAL

## 2022-11-07 VITALS — WEIGHT: 269 LBS | HEIGHT: 68 IN | BODY MASS INDEX: 40.77 KG/M2

## 2022-11-07 DIAGNOSIS — M17.0 PRIMARY OSTEOARTHRITIS OF BOTH KNEES: Primary | ICD-10-CM

## 2022-11-07 PROCEDURE — 20610 DRAIN/INJ JOINT/BURSA W/O US: CPT | Performed by: PHYSICIAN ASSISTANT

## 2022-11-07 RX ORDER — METHYLPREDNISOLONE ACETATE 80 MG/ML
80 INJECTION, SUSPENSION INTRA-ARTICULAR; INTRALESIONAL; INTRAMUSCULAR; SOFT TISSUE ONCE
Status: COMPLETED | OUTPATIENT
Start: 2022-11-07 | End: 2022-11-07

## 2022-11-07 RX ORDER — BUPIVACAINE HYDROCHLORIDE 2.5 MG/ML
2 INJECTION, SOLUTION INFILTRATION; PERINEURAL ONCE
Status: COMPLETED | OUTPATIENT
Start: 2022-11-07 | End: 2022-11-07

## 2022-11-07 RX ADMIN — METHYLPREDNISOLONE ACETATE 80 MG: 80 INJECTION, SUSPENSION INTRA-ARTICULAR; INTRALESIONAL; INTRAMUSCULAR; SOFT TISSUE at 16:12

## 2022-11-07 RX ADMIN — BUPIVACAINE HYDROCHLORIDE 5 MG: 2.5 INJECTION, SOLUTION INFILTRATION; PERINEURAL at 16:09

## 2022-11-07 RX ADMIN — BUPIVACAINE HYDROCHLORIDE 5 MG: 2.5 INJECTION, SOLUTION INFILTRATION; PERINEURAL at 16:11

## 2022-11-08 ASSESSMENT — ENCOUNTER SYMPTOMS
COLOR CHANGE: 0
VOMITING: 0
SHORTNESS OF BREATH: 0
COUGH: 0

## 2022-11-09 NOTE — PROGRESS NOTES
[address]  [address] [facility] [address]  Dept: [phone]  Dept Fax: [phone]        Ambulatory Follow Up      Subjective:   Lisa Yoder is a 47y.o. year old male who presents to our office today for routine followup regarding his   1. Primary osteoarthritis of both knees        Chief Complaint   Patient presents with    Follow-up     Bilateral knee pain         HPI Lisa Yoder  is a 47 y.o.  male who presents today in follow for bilateral knee pain. The patient was last seen on 5/23/2022 and underwent treatment in the form of bilateral knee corticosteroid injections. The patient notes significant improvement with the previous treatment that lasted until about 4 weeks ago. He notes he is interested in repeat injections today. He denies new trauma or injury to the bilateral knee. Review of Systems   Constitutional:  Negative for activity change and fever. HENT:  Negative for sneezing. Respiratory:  Negative for cough and shortness of breath. Cardiovascular:  Negative for chest pain. Gastrointestinal:  Negative for vomiting. Musculoskeletal:  Positive for arthralgias (bilateral knee). Negative for joint swelling and myalgias. Skin:  Negative for color change. Neurological:  Negative for weakness and numbness. Psychiatric/Behavioral:  Negative for sleep disturbance. Objective :   Ht 5' 8\" (1.727 m)   Wt 269 lb (122 kg)   BMI 40.90 kg/m²  Body mass index is 40.9 kg/m². General: Lisa Yoder is a 47 y.o. male who is alert and oriented and sitting comfortably in our office. Ortho Exam  MS:  Patient ambulates independently without a limp appreciated. Evaluation of the Bilateral knee reveals no significant outward deformity. There is no erythema, skin warmth, skin lesions, or signs of infection appreciated. There is tenderness over the medial joint line with palpation, bilaterally.   There is a no appreciable knee effusion noted bilaterally. Range of motion of the Bilateral knee is full. No instability of the knee is appreciated at 0 and 30° of flexion, bilaterally. There is a negative anterior drawer and Lachman's test, bilaterally. There is increased pain with medial Bella's testing, bilaterally. No calf tenderness is noted, bilaterally. There is a negative hip log roll and Stinchfield test on the Bilateral hip. Motor, sensory, vascular examination to the Bilateral lower extremity is intact. Patient has full range of motion of the Bilateral ankle. Neuro: alert and oriented to person and place. Eyes: Extra-ocular muscles intact  Mouth: Oral mucosa moist. No perioral lesions  Pulm: Respirations unlabored and regular. Symmetric chest excursion without outward deformity is noted. Skin: warm, well perfused  Psych:   Patient has good fund of knowledge and displays understanging of exam, diagnosis, and plan. Radiology:   No new imaging obtained today in office. XR KNEE LEFT (MIN 4 VIEWS)     Result Date: 5/25/2022  History:   Left knee pain. Findings:   Standing AP/Lateral/Tunnel/Merchant view xrays of the Left knee done in the office today shows moderate medial joint space narrowing, tricompartmental osteophytosis, joint line sclerosis medially. No evidence of fracture, subluxation, dislocation, radioopaque foreign body/tumor is noted. Lateral subluxation of the tibia is appreciated. Impression:   Left knee moderate degenerative changes as described above. XR KNEE RIGHT (MIN 4 VIEWS)     Result Date: 5/25/2022  History:   Right knee pain. Findings:   Standing AP/Lateral/Tunnel/Merchant view xrays of the Right knee done in the office today shows moderate medial joint space narrowing, tricompartmental osteophytosis, joint line sclerosis medially. No evidence of fracture, subluxation, dislocation, radioopaque foreign body/tumor is noted.   Lateral subluxation of the tibia is appreciated. Impression:   Right knee moderate degenerative changes as described above. Procedure:  KNEE INJECTION PROCEDURE NOTE:  The patient was identified. Verbal consent was obtained to proceed with a Right  knee injection. The Right knee was confirmed with the patient. After a sterile prep with Betadine the knee was injected using a lateral joint line approach with a mixture of  2mL of 0.25% Marcaine and 80 mg of Depo-Medrol. Patient tolerated the procedure well without post injection complications. I instructed the patient to call our office immediately if they have any swelling or increased pain at the injection site. KNEE INJECTION PROCEDURE NOTE:  The patient was identified. Verbal consent was obtained to proceed with a Left  knee injection. The Left knee was confirmed with the patient. After a sterile prep with Betadine the knee was injected using a lateral joint line approach with a mixture of  2mL of 0.25% Marcaine and 80 mg of Depo-Medrol. Patient tolerated the procedure well without post injection complications. I instructed the patient to call our office immediately if they have any swelling or increased pain at the injection site. Assessment:      1. Primary osteoarthritis of both knees         Plan:      Patient is here today for continued bilateral knee pain due to osteoarthritis. Patient was last seen on 5/25/2022 and underwent treatment in the form of bilateral knee corticosteroid injections. Patient had significant improvement with the injections and requested repeat bilateral knee corticosteroid injections today in office. The patient was given the injections and tolerated the procedures without complication. We also discussed continued activity modification to help limit his bilateral knee pain.   Our office will begin the prior authorization process for bilateral knee Visco supplemental injections to help extend the time in between corticosteroid injections and help with patient's bilateral knee pain. I discussed with the patient that we will submit a prior authorization for bilateral knee Visco supplemental injections (Durolane preferred). We will call him when we receive approval/denial for the injections. If approved he is to follow-up 2 weeks after his corticosteroid injections which would be on or after 11/21/2022. Patient was instructed to call our office with any questions or concerns prior to his next appointment. He noted his understanding. Follow up:Return for After approval/denial for bilateral knee gel injections. .    Total Time: 30 min      Orders Placed This Encounter   Medications    methylPREDNISolone acetate (DEPO-MEDROL) injection 80 mg    methylPREDNISolone acetate (DEPO-MEDROL) injection 80 mg    bupivacaine (MARCAINE) 0.25 % injection 5 mg    bupivacaine (MARCAINE) 0.25 % injection 5 mg       Orders Placed This Encounter   Procedures    20610 - DRAIN/INJECT LARGE JOINT BURSA       This note is created with the assistance of a speech recognition program.  While intending to generate a document that actually reflects the content of the visit, the document can still have some errors including those of syntax and sound a like substitutions which may escape proof reading. In such instances, actual meaning can be extrapolated by contextual diversion.      Electronically signed by Kaleigh Quiroz PA-C on 11/8/2022 at 9:42 PM

## 2023-05-10 ENCOUNTER — HOSPITAL ENCOUNTER (OUTPATIENT)
Age: 53
Setting detail: SPECIMEN
Discharge: HOME | End: 2023-05-10

## 2023-05-10 DIAGNOSIS — I10 ESSENTIAL HYPERTENSION: ICD-10-CM

## 2023-05-10 DIAGNOSIS — E78.5 HYPERLIPIDEMIA, UNSPECIFIED HYPERLIPIDEMIA TYPE: ICD-10-CM

## 2023-05-10 LAB
ALBUMIN SERPL-MCNC: 4.3 G/DL (ref 3.5–5.2)
ALBUMIN/GLOBULIN RATIO: 1.3 (ref 1–2.5)
ALP SERPL-CCNC: 73 U/L (ref 40–129)
ALT SERPL-CCNC: 42 U/L (ref 5–41)
ANION GAP SERPL CALCULATED.3IONS-SCNC: 15 MMOL/L (ref 9–17)
AST SERPL-CCNC: 27 U/L
BILIRUB SERPL-MCNC: 0.5 MG/DL (ref 0.3–1.2)
BUN SERPL-MCNC: 18 MG/DL (ref 6–20)
CALCIUM SERPL-MCNC: 9.9 MG/DL (ref 8.6–10.4)
CHLORIDE SERPL-SCNC: 101 MMOL/L (ref 98–107)
CHOLEST SERPL-MCNC: 139 MG/DL
CHOLESTEROL/HDL RATIO: 3.1
CO2 SERPL-SCNC: 23 MMOL/L (ref 20–31)
CREAT SERPL-MCNC: 1.01 MG/DL (ref 0.7–1.2)
GFR SERPL CREATININE-BSD FRML MDRD: >60 ML/MIN/1.73M2
GLUCOSE SERPL-MCNC: 82 MG/DL (ref 70–99)
HDLC SERPL-MCNC: 45 MG/DL
LDLC SERPL CALC-MCNC: 79 MG/DL (ref 0–130)
POTASSIUM SERPL-SCNC: 4.4 MMOL/L (ref 3.7–5.3)
PROT SERPL-MCNC: 7.7 G/DL (ref 6.4–8.3)
SODIUM SERPL-SCNC: 139 MMOL/L (ref 135–144)
TRIGL SERPL-MCNC: 75 MG/DL

## 2024-05-31 ENCOUNTER — HOSPITAL ENCOUNTER (OUTPATIENT)
Age: 54
Setting detail: SPECIMEN
Discharge: HOME | End: 2024-05-31

## 2024-05-31 DIAGNOSIS — I10 ESSENTIAL HYPERTENSION: ICD-10-CM

## 2024-05-31 DIAGNOSIS — E78.5 HYPERLIPIDEMIA, UNSPECIFIED HYPERLIPIDEMIA TYPE: ICD-10-CM

## 2024-05-31 LAB
ALBUMIN SERPL-MCNC: 4.5 G/DL (ref 3.5–5.2)
ALBUMIN/GLOB SERPL: 1 {RATIO} (ref 1–2.5)
ALP SERPL-CCNC: 75 U/L (ref 40–129)
ALT SERPL-CCNC: 41 U/L (ref 10–50)
ANION GAP SERPL CALCULATED.3IONS-SCNC: 13 MMOL/L (ref 9–16)
AST SERPL-CCNC: 36 U/L (ref 10–50)
BASOPHILS # BLD: 0.07 K/UL (ref 0–0.2)
BASOPHILS NFR BLD: 1 % (ref 0–2)
BILIRUB SERPL-MCNC: 0.7 MG/DL (ref 0–1.2)
BUN SERPL-MCNC: 15 MG/DL (ref 6–20)
CALCIUM SERPL-MCNC: 9.5 MG/DL (ref 8.6–10.4)
CHLORIDE SERPL-SCNC: 104 MMOL/L (ref 98–107)
CHOLEST SERPL-MCNC: 160 MG/DL (ref 0–199)
CHOLESTEROL/HDL RATIO: 4
CO2 SERPL-SCNC: 25 MMOL/L (ref 20–31)
CREAT SERPL-MCNC: 1.2 MG/DL (ref 0.7–1.2)
EOSINOPHIL # BLD: 0.42 K/UL (ref 0–0.44)
EOSINOPHILS RELATIVE PERCENT: 4 % (ref 1–4)
ERYTHROCYTE [DISTWIDTH] IN BLOOD BY AUTOMATED COUNT: 13.4 % (ref 11.8–14.4)
GFR, ESTIMATED: 76 ML/MIN/1.73M2
GLUCOSE P FAST SERPL-MCNC: 87 MG/DL (ref 74–99)
HCT VFR BLD AUTO: 49.7 % (ref 40.7–50.3)
HDLC SERPL-MCNC: 43 MG/DL
HGB BLD-MCNC: 16.9 G/DL (ref 13–17)
IMM GRANULOCYTES # BLD AUTO: 0.05 K/UL (ref 0–0.3)
IMM GRANULOCYTES NFR BLD: 1 %
LDLC SERPL CALC-MCNC: 95 MG/DL (ref 0–100)
LYMPHOCYTES NFR BLD: 2.26 K/UL (ref 1.1–3.7)
LYMPHOCYTES RELATIVE PERCENT: 23 % (ref 24–43)
MCH RBC QN AUTO: 28.6 PG (ref 25.2–33.5)
MCHC RBC AUTO-ENTMCNC: 34 G/DL (ref 28.4–34.8)
MCV RBC AUTO: 84.2 FL (ref 82.6–102.9)
MONOCYTES NFR BLD: 1.02 K/UL (ref 0.1–1.2)
MONOCYTES NFR BLD: 10 % (ref 3–12)
NEUTROPHILS NFR BLD: 61 % (ref 36–65)
NEUTS SEG NFR BLD: 6.09 K/UL (ref 1.5–8.1)
NRBC BLD-RTO: 0 PER 100 WBC
PLATELET # BLD AUTO: 444 K/UL (ref 138–453)
PMV BLD AUTO: 9.1 FL (ref 8.1–13.5)
POTASSIUM SERPL-SCNC: 4.4 MMOL/L (ref 3.7–5.3)
PROT SERPL-MCNC: 7.9 G/DL (ref 6.6–8.7)
RBC # BLD AUTO: 5.9 M/UL (ref 4.21–5.77)
SODIUM SERPL-SCNC: 142 MMOL/L (ref 136–145)
TRIGL SERPL-MCNC: 111 MG/DL (ref 0–149)
TSH SERPL DL<=0.05 MIU/L-ACNC: 2.27 UIU/ML (ref 0.27–4.2)
VLDLC SERPL CALC-MCNC: 22 MG/DL
WBC OTHER # BLD: 9.9 K/UL (ref 3.5–11.3)

## 2024-12-24 DIAGNOSIS — M25.562 PAIN IN BOTH KNEES, UNSPECIFIED CHRONICITY: Primary | ICD-10-CM

## 2024-12-24 DIAGNOSIS — M25.561 PAIN IN BOTH KNEES, UNSPECIFIED CHRONICITY: Primary | ICD-10-CM

## 2024-12-30 DIAGNOSIS — M25.562 PAIN IN BOTH KNEES, UNSPECIFIED CHRONICITY: ICD-10-CM

## 2024-12-30 DIAGNOSIS — M25.561 PAIN IN BOTH KNEES, UNSPECIFIED CHRONICITY: ICD-10-CM

## 2024-12-31 ENCOUNTER — OFFICE VISIT (OUTPATIENT)
Dept: ORTHOPEDIC SURGERY | Age: 54
End: 2024-12-31
Payer: COMMERCIAL

## 2024-12-31 VITALS — BODY MASS INDEX: 44.1 KG/M2 | WEIGHT: 291 LBS | OXYGEN SATURATION: 97 % | RESPIRATION RATE: 17 BRPM | HEIGHT: 68 IN

## 2024-12-31 DIAGNOSIS — M25.561 PAIN IN BOTH KNEES, UNSPECIFIED CHRONICITY: Primary | ICD-10-CM

## 2024-12-31 DIAGNOSIS — M25.562 PAIN IN BOTH KNEES, UNSPECIFIED CHRONICITY: Primary | ICD-10-CM

## 2024-12-31 DIAGNOSIS — M17.0 ARTHRITIS OF BOTH KNEES: ICD-10-CM

## 2024-12-31 PROCEDURE — 99213 OFFICE O/P EST LOW 20 MIN: CPT

## 2024-12-31 PROCEDURE — 20610 DRAIN/INJ JOINT/BURSA W/O US: CPT

## 2024-12-31 NOTE — PROGRESS NOTES
[facility] ORTHOPEDICS AND SPORTS MEDICINE  [address]  [place] [address]  Dept: [phone]  Dept Fax: [phone]        Ambulatory Follow Up      Subjective:   Tray Peralta is a 54 y.o. year old male who presents to our office today for routine followup regarding his   1. Pain in both knees, unspecified chronicity    2. Arthritis of both knees    .    Chief Complaint   Patient presents with    Knee Pain     Bilateral knee pain- B Li: 11/7/22       History of Present Illness  The patient is a 54-year-old male who presents for evaluation of bilateral knee pain.    He was last seen on 11/07/2022 by Kaity Rivero PA-C  for bilateral knee pain, at which time he received steroid injections in both knees. These injections were administered every 4 months until he transitioned to a less physically demanding job. 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 The relief from the last injection lasted approximately 2.5 to 3 months. A gel injection was previously suggested as a potential treatment option, but he has not yet received this. He reports that prolonged walking, such as during shopping trips, results in significant pain, to the point where he struggles to return to his vehicle. His job involves repetitive movements, which exacerbate his pain by the end of the day. He reports no swelling, redness, or warmth in either knee. He does not take any medications for pain management, preferring to avoid them unless absolutely necessary. He occasionally takes aspirin but avoids ibuprofen and naproxen due to concerns about potential side effects. He reports that his right knee is generally more painful than the left, but both knees cause him significant discomfort. He experiences difficulty descending stairs and must use a pillow to prevent

## 2024-12-31 NOTE — PATIENT INSTRUCTIONS
INJECTION CARE    The injection site should never get red, hot, or swollen and if it does the patient will contact our office right away. With a cortisone steroid injection, the patient may experience a increase in soreness the first 24-48 hours due to a cortisone flair and can take anti-inflammatories for a short period of time to reduce that soreness. With a lubrication injection, on rare occasion the patient may develop swelling and pain if having a reaction to the medication. If this happens, try an anti-inflammatory medication and ice as needed. If pain and swelling continues, call the office for further instructions. The patient should not submerge the injection site in water for a minimum of 24 hours to avoid infection. This means no lakes, pools, ponds, or hot tubs for 24 hours. If the patient is diabetic the injection may increase their blood sugar for up to one week. The patient can do this cortisone injection once every 3 months as needed and lubrication injections every 6 months.                                                                                                                                                                                                                                                                                                                 PATIENTIQ:  PatientIQ helps [facility] stay in touch with you to know how you're feeling, and provides education and care instructions to you at various time points.   Your answers help your care team track your progress to provide the best care possible. PatientIQ will contact you pre-op and post-op via email or text with:  Educational Videos and Care Instructions  Questionnaires About How You're Feeling    Your participation provides you valuable education and helps [facility] continue to provide quality care to all patients. Thank you

## 2025-01-01 RX ORDER — BUPIVACAINE HYDROCHLORIDE 2.5 MG/ML
4 INJECTION, SOLUTION INFILTRATION; PERINEURAL ONCE
Status: COMPLETED | OUTPATIENT
Start: 2025-01-01 | End: 2025-01-02

## 2025-01-01 RX ORDER — METHYLPREDNISOLONE ACETATE 80 MG/ML
80 INJECTION, SUSPENSION INTRA-ARTICULAR; INTRALESIONAL; INTRAMUSCULAR; SOFT TISSUE ONCE
Status: COMPLETED | OUTPATIENT
Start: 2025-01-01 | End: 2025-01-02

## 2025-01-01 ASSESSMENT — ENCOUNTER SYMPTOMS: COLOR CHANGE: 0

## 2025-01-02 RX ADMIN — METHYLPREDNISOLONE ACETATE 80 MG: 80 INJECTION, SUSPENSION INTRA-ARTICULAR; INTRALESIONAL; INTRAMUSCULAR; SOFT TISSUE at 10:59

## 2025-01-02 RX ADMIN — BUPIVACAINE HYDROCHLORIDE 10 MG: 2.5 INJECTION, SOLUTION INFILTRATION; PERINEURAL at 10:58

## 2025-01-02 RX ADMIN — METHYLPREDNISOLONE ACETATE 80 MG: 80 INJECTION, SUSPENSION INTRA-ARTICULAR; INTRALESIONAL; INTRAMUSCULAR; SOFT TISSUE at 10:58

## 2025-02-07 ENCOUNTER — PROCEDURE VISIT (OUTPATIENT)
Dept: ORTHOPEDIC SURGERY | Age: 55
End: 2025-02-07

## 2025-02-07 VITALS — BODY MASS INDEX: 42.44 KG/M2 | OXYGEN SATURATION: 99 % | WEIGHT: 280 LBS | HEIGHT: 68 IN | RESPIRATION RATE: 15 BRPM

## 2025-02-07 DIAGNOSIS — M17.0 ARTHRITIS OF BOTH KNEES: Primary | ICD-10-CM

## 2025-02-07 RX ORDER — LIDOCAINE HYDROCHLORIDE 10 MG/ML
6 INJECTION, SOLUTION INFILTRATION; PERINEURAL ONCE
Status: COMPLETED | OUTPATIENT
Start: 2025-02-07 | End: 2025-02-07

## 2025-02-07 RX ADMIN — LIDOCAINE HYDROCHLORIDE 6 ML: 10 INJECTION, SOLUTION INFILTRATION; PERINEURAL at 11:41

## 2025-02-07 NOTE — PROGRESS NOTES
[facility] ORTHOPEDICS AND SPORTS MEDICINE  [address]  [place] [facility] [address]  Dept: [phone]  Dept Fax: [phone]          Bilateral Knee Visit - Follow up    Subjective:     Chief Complaint   Patient presents with    Knee Pain     Bilateral knee pain- Durolane     HPI:     Tray Peralta presents today for bilateral knee Durolane injections.  He had bilateral knee corticosteroid injections on 12/31/2024 and these provided him with significant relief.  His insurance has approved the Durolane injections.  He has never received Durling injections before.    I have reviewed the CC, and if not present in this note, I have reviewed in the patient's chart.   I agree with the documentation provided by other staff and have reviewed their documentation prior to providing my signature indicating agreement.  Vitals:   Resp 15   Ht 1.727 m (5' 8\")   Wt 127 kg (280 lb)   SpO2 99%   BMI 42.57 kg/m²  Body mass index is 42.57 kg/m².    Assessment:     1. Arthritis of both knees      Examination of the bilateral knees demonstrates that the skin is intact and there is no erythema, ecchymosis, or soft tissue swelling appreciated.    Procedure:   Procedure: Yes    After informed consent was obtained verbally, the Bilateral knee was sterilely prepped with Betadine and locally anesthetized with ethyl chloride spray and 3cc 1% lidocaine at the lateral joint line just lateral to the patellar tendon.  The bilateral knee was then injected through this injection site with a Durolane prefilled syringe injection.  The patient tolerated the procedure well without postinjection complications. Band-aids were applied.       Plan:     The patient presented today for bilateral knee Durolane injections.  He last had steroid injections on 12/31/2024 and these provided him with significant relief.  He has never had gel injections before.  The injections were

## 2025-02-07 NOTE — PATIENT INSTRUCTIONS
PATIENTIQ:  PatientIQ helps [facility] stay in touch with you to know how you're feeling, and provides education and care instructions to you at various time points.   Your answers help your care team track your progress to provide the best care possible. PatientIQ will contact you pre-op and post-op via email or text with:  Educational Videos and Care Instructions  Questionnaires About How You're Feeling    Your participation provides you valuable education and helps [facility] continue to provide quality care to all patients. Thank you

## 2025-07-08 ENCOUNTER — TELEPHONE (OUTPATIENT)
Dept: ORTHOPEDIC SURGERY | Age: 55
End: 2025-07-08

## 2025-07-08 NOTE — TELEPHONE ENCOUNTER
Patient called to request B knee gel injections in August. His last gel injections were on 2/7/25 with Lily Kendrick PA-C. Patient states he is okay with seeing Kaity Rivero PA-C since Lily out on leave right now. Does he need a prior authorization with his insurance? Please advise. His call back number is [phone]. Thank you.

## 2025-08-21 ENCOUNTER — PROCEDURE VISIT (OUTPATIENT)
Dept: ORTHOPEDIC SURGERY | Age: 55
End: 2025-08-21

## 2025-08-21 VITALS — BODY MASS INDEX: 42.44 KG/M2 | HEIGHT: 68 IN | WEIGHT: 280 LBS

## 2025-08-21 DIAGNOSIS — M25.561 CHRONIC PAIN OF BOTH KNEES: ICD-10-CM

## 2025-08-21 DIAGNOSIS — G89.29 CHRONIC PAIN OF BOTH KNEES: ICD-10-CM

## 2025-08-21 DIAGNOSIS — M25.562 CHRONIC PAIN OF BOTH KNEES: ICD-10-CM

## 2025-08-21 DIAGNOSIS — M17.0 ARTHRITIS OF BOTH KNEES: Primary | ICD-10-CM

## 2025-08-21 RX ORDER — BUPIVACAINE HYDROCHLORIDE 2.5 MG/ML
2 INJECTION, SOLUTION INFILTRATION; PERINEURAL ONCE
Status: COMPLETED | OUTPATIENT
Start: 2025-08-21 | End: 2025-08-21

## 2025-08-21 RX ADMIN — BUPIVACAINE HYDROCHLORIDE 5 MG: 2.5 INJECTION, SOLUTION INFILTRATION; PERINEURAL at 15:00

## 2025-08-21 ASSESSMENT — ENCOUNTER SYMPTOMS
COLOR CHANGE: 0
COUGH: 0
SHORTNESS OF BREATH: 0
VOMITING: 0

## (undated) DEVICE — PREP-RESISTANT MARKER W/ RULER: Brand: MEDLINE INDUSTRIES, INC.

## (undated) DEVICE — GOWN,AURORA,NONREINFORCED,LARGE: Brand: MEDLINE

## (undated) DEVICE — SYRINGE MED 10ML TRNSLUC BRL PLUNG BLK MRK POLYPR CTRL

## (undated) DEVICE — CODMAN® SURGICAL PATTIES 1/2" X 1/2" (1.27CM X 1.27CM): Brand: CODMAN®

## (undated) DEVICE — DRAPE,REIN 53X77,STERILE: Brand: MEDLINE

## (undated) DEVICE — GAUZE,SPONGE,4"X4",16PLY,XRAY,STRL,LF: Brand: MEDLINE

## (undated) DEVICE — NEEDLE FLTR 18GA L1.5IN WALL THK5UM PNK POLYPR HUB S STL

## (undated) DEVICE — PAD N ADH W3XL4IN POLY COT SFT PERF FLM EASILY CUT ABSRB

## (undated) DEVICE — TUBING, SUCTION, 1/4" X 12', STRAIGHT: Brand: MEDLINE

## (undated) DEVICE — MEDICINE CUP, GRADUATED, STER: Brand: MEDLINE

## (undated) DEVICE — TOWEL,OR,DSP,ST,BLUE,DLX,XR,4/PK,20PK/CS: Brand: MEDLINE

## (undated) DEVICE — GLOVE SURG SZ 7 CRM LTX FREE POLYISOPRENE POLYMER BEAD ANTI